# Patient Record
Sex: FEMALE | Race: WHITE
[De-identification: names, ages, dates, MRNs, and addresses within clinical notes are randomized per-mention and may not be internally consistent; named-entity substitution may affect disease eponyms.]

---

## 2017-04-18 ENCOUNTER — HOSPITAL ENCOUNTER (OUTPATIENT)
Dept: HOSPITAL 45 - C.LAB1850 | Age: 27
Discharge: HOME | End: 2017-04-18
Attending: OBSTETRICS & GYNECOLOGY
Payer: COMMERCIAL

## 2017-04-18 DIAGNOSIS — Z34.90: Primary | ICD-10-CM

## 2017-04-20 ENCOUNTER — HOSPITAL ENCOUNTER (OUTPATIENT)
Dept: HOSPITAL 45 - C.LAB1850 | Age: 27
Discharge: HOME | End: 2017-04-20
Attending: OBSTETRICS & GYNECOLOGY
Payer: COMMERCIAL

## 2017-04-20 DIAGNOSIS — Z34.90: Primary | ICD-10-CM

## 2017-05-08 ENCOUNTER — HOSPITAL ENCOUNTER (OUTPATIENT)
Dept: HOSPITAL 45 - C.LAB1850 | Age: 27
Discharge: HOME | End: 2017-05-08
Attending: OBSTETRICS & GYNECOLOGY
Payer: COMMERCIAL

## 2017-05-08 ENCOUNTER — HOSPITAL ENCOUNTER (OUTPATIENT)
Dept: HOSPITAL 45 - C.LABSPEC | Age: 27
Discharge: HOME | End: 2017-05-08
Attending: OBSTETRICS & GYNECOLOGY
Payer: COMMERCIAL

## 2017-05-08 DIAGNOSIS — Z34.90: Primary | ICD-10-CM

## 2017-05-08 LAB
BASOPHILS # BLD: 0.01 K/UL (ref 0–0.2)
BASOPHILS NFR BLD: 0.1 %
COMPLETE: YES
EOSINOPHIL NFR BLD AUTO: 261 K/UL (ref 130–400)
HCT VFR BLD CALC: 39.1 % (ref 37–47)
IG%: 0.2 %
IMM GRANULOCYTES NFR BLD AUTO: 27.7 %
LYMPHOCYTES # BLD: 2.44 K/UL (ref 1.2–3.4)
MCH RBC QN AUTO: 29.4 PG (ref 25–34)
MCHC RBC AUTO-ENTMCNC: 33 G/DL (ref 32–36)
MCV RBC AUTO: 89.1 FL (ref 80–100)
MONOCYTES NFR BLD: 9.1 %
NEUTROPHILS # BLD AUTO: 1.1 %
NEUTROPHILS NFR BLD AUTO: 61.8 %
PMV BLD AUTO: 10.8 FL (ref 7.4–10.4)
RBC # BLD AUTO: 4.39 M/UL (ref 4.2–5.4)
WBC # BLD AUTO: 8.82 K/UL (ref 4.8–10.8)

## 2017-05-11 LAB
CHLAMYDIA TRACH RNA***: NOT DETECTED
GC (NEIS GONORRHOEAE)RNA**: NOT DETECTED

## 2017-07-03 ENCOUNTER — HOSPITAL ENCOUNTER (OUTPATIENT)
Dept: HOSPITAL 45 - C.LAB1850 | Age: 27
Discharge: HOME | End: 2017-07-03
Attending: OBSTETRICS & GYNECOLOGY
Payer: COMMERCIAL

## 2017-07-03 DIAGNOSIS — Z34.82: Primary | ICD-10-CM

## 2017-07-03 LAB — GTGD: 50 GRAMS

## 2017-10-03 ENCOUNTER — HOSPITAL ENCOUNTER (OUTPATIENT)
Dept: HOSPITAL 45 - C.LAB1850 | Age: 27
Discharge: HOME | End: 2017-10-03
Attending: OBSTETRICS & GYNECOLOGY
Payer: COMMERCIAL

## 2017-10-03 DIAGNOSIS — Z34.82: Primary | ICD-10-CM

## 2017-10-03 LAB
APPEARANCE UR: (no result)
BILIRUB UR-MCNC: (no result) MG/DL
COLOR UR: YELLOW
GTGD: 50 GRAMS
HCT VFR BLD CALC: 30.5 % (ref 37–47)
MANUAL MICROSCOPIC REQUIRED?: NO
NITRITE UR QL STRIP: (no result)
PH UR STRIP: 6 [PH] (ref 4.5–7.5)
REVIEW REQ?: YES
SP GR UR STRIP: 1.02 (ref 1–1.03)
URINE EPITHELIAL CELL AUTO: >30 /LPF (ref 0–5)
UROBILINOGEN UR-MCNC: (no result) MG/DL

## 2017-11-29 ENCOUNTER — HOSPITAL ENCOUNTER (OUTPATIENT)
Dept: HOSPITAL 45 - C.LABSPEC | Age: 27
Discharge: HOME | End: 2017-11-29
Attending: OBSTETRICS & GYNECOLOGY
Payer: COMMERCIAL

## 2017-11-29 DIAGNOSIS — Z34.83: Primary | ICD-10-CM

## 2017-12-21 ENCOUNTER — HOSPITAL ENCOUNTER (INPATIENT)
Dept: HOSPITAL 45 - C.LD | Age: 27
LOS: 7 days | Discharge: HOME | End: 2017-12-28
Attending: OBSTETRICS & GYNECOLOGY | Admitting: OBSTETRICS & GYNECOLOGY
Payer: COMMERCIAL

## 2017-12-21 VITALS
WEIGHT: 172.4 LBS | HEIGHT: 64.02 IN | BODY MASS INDEX: 29.43 KG/M2 | HEIGHT: 64.02 IN | WEIGHT: 172.4 LBS | BODY MASS INDEX: 29.43 KG/M2

## 2017-12-21 DIAGNOSIS — Z3A.40: ICD-10-CM

## 2017-12-21 DIAGNOSIS — O48.0: Primary | ICD-10-CM

## 2017-12-21 DIAGNOSIS — Z88.0: ICD-10-CM

## 2017-12-27 VITALS — TEMPERATURE: 97.88 F | HEART RATE: 97 BPM | SYSTOLIC BLOOD PRESSURE: 123 MMHG | DIASTOLIC BLOOD PRESSURE: 62 MMHG

## 2017-12-27 LAB
EOSINOPHIL NFR BLD AUTO: 200 K/UL (ref 130–400)
HCT VFR BLD CALC: 32.5 % (ref 37–47)
HGB BLD-MCNC: 10.7 G/DL (ref 12–16)
MCH RBC QN AUTO: 27.9 PG (ref 25–34)
MCHC RBC AUTO-ENTMCNC: 32.9 G/DL (ref 32–36)
MCV RBC AUTO: 84.9 FL (ref 80–100)
PMV BLD AUTO: 11.2 FL (ref 7.4–10.4)
RED CELL DISTRIBUTION WIDTH CV: 13.8 % (ref 11.5–14.5)
RED CELL DISTRIBUTION WIDTH SD: 42 FL (ref 36.4–46.3)
WBC # BLD AUTO: 12.04 K/UL (ref 4.8–10.8)

## 2017-12-27 PROCEDURE — 3E033VJ INTRODUCTION OF OTHER HORMONE INTO PERIPHERAL VEIN, PERCUTANEOUS APPROACH: ICD-10-PCS | Performed by: OBSTETRICS & GYNECOLOGY

## 2017-12-27 PROCEDURE — 0KQM0ZZ REPAIR PERINEUM MUSCLE, OPEN APPROACH: ICD-10-PCS | Performed by: OBSTETRICS & GYNECOLOGY

## 2017-12-27 RX ADMIN — SODIUM CHLORIDE, SODIUM LACTATE, POTASSIUM CHLORIDE, AND CALCIUM CHLORIDE SCH MLS/HR: 600; 310; 30; 20 INJECTION, SOLUTION INTRAVENOUS at 09:14

## 2017-12-27 RX ADMIN — Medication PRN MG: at 19:40

## 2017-12-27 RX ADMIN — DOCUSATE SODIUM SCH MG: 100 CAPSULE, LIQUID FILLED ORAL at 21:21

## 2017-12-27 NOTE — ANESTHESIA PROCEDURE NOTE
Anesthesia Epidural Removal Nt


Date & Time


Dec 27, 2017 at 18:36





Vital Signs


Pain Intensity:  5.0





Notes


Mental Status:  alert / awake / arousable, participated in evaluation


Nausea / Vomiting:  adequately controlled


Pain:  adequately controlled


Airway Patency, RR, SpO2:  stable & adequate


BP & HR:  stable & adequate


Hydration State:  stable & adequate


Neuraxial Anesthesia:  was administered, sensory block is resolving


Anesthetic Complications:  no major complications apparent, pt satisfied with 

anesthetic care


Epidural:  removed without complications, with tip intact

## 2017-12-27 NOTE — DELIVERY SUMMARY
DATE OF OPERATION:  12/27/2017

 

Apple was induced for postdates.  Induction was begun with IV Pitocin.  She

then received an epidural.  Membranes were ruptured for clear fluid.  She

then progressed to fully dilated and pushed over a total of 2 contractions. 

Baby was delivered in right occiput anterior position.  There was double

nuchal cord, I was able to pass this over the fetal head.  Mouth and then

nares suctioned.  Fluid was clear.  Gentle traction used, no excessive force.

 Baby delivered without any difficulties.  Live vigorous female infant.  Cord

clamped and cut.  Cord gases obtained.  Cord blood obtained.  Placenta

removed with gentle traction.  Second degree tear repaired with 3-0 Vicryl. 

Estimated blood loss 250 mL.  Sponge and instrument counts correct.

 

 

I attest to the content of the Intraoperative Record and any orders documented therein. Any exception
s are noted below.

## 2017-12-28 VITALS
TEMPERATURE: 98.06 F | SYSTOLIC BLOOD PRESSURE: 104 MMHG | DIASTOLIC BLOOD PRESSURE: 71 MMHG | OXYGEN SATURATION: 98 % | HEART RATE: 82 BPM

## 2017-12-28 VITALS
HEART RATE: 80 BPM | OXYGEN SATURATION: 98 % | DIASTOLIC BLOOD PRESSURE: 64 MMHG | SYSTOLIC BLOOD PRESSURE: 112 MMHG | TEMPERATURE: 98.06 F

## 2017-12-28 VITALS — DIASTOLIC BLOOD PRESSURE: 71 MMHG | HEART RATE: 82 BPM | TEMPERATURE: 98.06 F

## 2017-12-28 VITALS
TEMPERATURE: 98.06 F | SYSTOLIC BLOOD PRESSURE: 119 MMHG | OXYGEN SATURATION: 97 % | HEART RATE: 82 BPM | DIASTOLIC BLOOD PRESSURE: 76 MMHG

## 2017-12-28 LAB
HCT VFR BLD CALC: 29.8 % (ref 37–47)
HGB BLD-MCNC: 9.6 G/DL (ref 12–16)

## 2017-12-28 RX ADMIN — DOCUSATE SODIUM SCH MG: 100 CAPSULE, LIQUID FILLED ORAL at 07:32

## 2017-12-28 RX ADMIN — SODIUM CHLORIDE, SODIUM LACTATE, POTASSIUM CHLORIDE, AND CALCIUM CHLORIDE SCH MLS/HR: 600; 310; 30; 20 INJECTION, SOLUTION INTRAVENOUS at 06:16

## 2017-12-28 RX ADMIN — Medication PRN MG: at 11:44

## 2017-12-28 RX ADMIN — Medication PRN MG: at 02:08

## 2017-12-28 RX ADMIN — Medication PRN MG: at 07:32

## 2017-12-28 NOTE — OB/GYN PROGRESS NOTE
OB/GYN Progress Note


Date of Service


Dec 28, 2017.





Subjective


conversation w/ patient, physical exam, chart review, lab review


Ambulation:  ambulating normally


Voiding:  no voiding problems


Passing Gas:  Yes


Diet Tolerance:  Regular Diet


Lochia:  Moderate


Feeding Type:  Breast Feeding





Review of Systems


Constitutional:  No fever, No chills, No sweats


Respiratory:  No cough, No shortness of breath, No dyspnea on exertion


Cardiac:  No chest pain, No edema, No palpitations


Abdomen:  + pain, No nausea, No vomiting


Female :  No dysuria, No urinary frequency





Objective


Vital Signs











  Date Time  Temp Pulse Resp B/P (MAP) Pulse Ox O2 Delivery O2 Flow Rate FiO2


 


17 21:30      Room Air  


 


17 21:30 36.6 97 18 123/62    











Physical Exam


General Appearance:  WELL-APPEARING, WD/WN, NO APPARENT DISTRESS


Respiratory/Chest:  chest non-tender, lungs clear, normal breath sounds, no 

respiratory distress, no accessory muscle use


Cardiovascular:  regular rate, rhythm, no edema, no murmur


Fundus:  Firm, Relation to Umbilicus (at the level of the umbilicus )


Extremities:  no calf tenderness





Laboratory Results





Last 24 Hours








Test


  17


09:14 17


06:30


 


White Blood Count 12.04 K/uL  


 


Red Blood Count 3.83 M/uL  


 


Hemoglobin 10.7 g/dL  


 


Hematocrit 32.5 %  


 


Mean Corpuscular Volume 84.9 fL  


 


Mean Corpuscular Hemoglobin 27.9 pg  


 


Mean Corpuscular Hemoglobin


Concent 32.9 g/dl 


  


 


 


RDW Standard Deviation 42.0 fL  


 


RDW Coefficient of Variation 13.8 %  


 


Platelet Count 200 K/uL  


 


Mean Platelet Volume 11.2 fL  











Assessment and Plan


Post-Partum


Day Number:  1


Continue Routine Care:


27 F  now 2 delivered vaginally on  18:11. Gave birth to a female at 

40.6 weeks gestation. O+/GBS-/RI/ Vitals reviewed and WNL. Hgb 10.7 on , 

today is 9.6,  no signs or sx of anemia. Pt is doing well clinically. Continue 

routine pp care; monitor lochia, control pain with Tylenol/ibuprofen, encourage 

ambulation and support breastfeeding. 





Resident Physician Supervision Note:





I was present with Dr. padilla during the history and exam. I discussed the 

case with the resident and agree with the findings and plan as documented in 

the note.  Any exceptions or clarifications are listed here: [None]





Documented By:  Michael Rojas

## 2017-12-28 NOTE — DISCHARGE INSTRUCTIONS
Discharge Instructions


Date of Service


Dec 28, 2017.





Admission


Reason for Admission:  Induction





Discharge


Discharge Diagnosis / Problem:  recovery from vaginal delivery





Discharge Goals


Goal(s):  Routine recovery after delivery





Medications


Continue Dispensed Medications:  supercream, dermaplast, tucks, lansinoh





Activity Recommendations


Activity Limitations:  per Instructions/Follow-up section





.





Instructions / Follow-Up


Instructions / Follow-Up





ACTIVITY RECOMMENDATIONS:





* Gradual return to full activity over the next 2-3 weeks.


* No lifting - nothing heavier than baby over the next 2-3 weeks.


* Do not engage in vigorous exercise, sexual activity or sports until cleared by


   your physician.


* Do not drive or operate any motorized equipment until cleared by your 

physician.


* You may shower/bathe daily.








MEDICATIONS:





For discomfort or pain, you may use Acetaminophen (Tylenol), Ibuprofen (Advil),


or Naproxen (Aleve) following the package directions. For constipation you may 


use Colace following the package directions.








BREAST CARE:





If you are not breast feeding:





*  Wear a supportive bra 24 hours a day for one to two weeks.


*  Avoid stimulating your breasts and nipples as much as possible during the 

first 


    few weeks after delivery.


*  When taking a shower, have the warm water hit your back, not breasts.


*  When your breasts feel full, apply ice packs.  Usually three to four times a 

day


    helps ease the discomfort.


*  Take a mild pain medication (Tylenol / Motrin) when you are uncomfortable.





If breast feeding:





*  Use breast milk to lubricate nipples.  Lansinoh cream may be used for sore 

nipples. 


    You do not need to remove cream prior to breast feeding.  If using a 

different brand


   of cream, check the label for directions regarding removal of cream prior to 

nursing.


*  Wear a supportive bra.


*  If having problems with breasts or breast feeding, call a lactation 

consultant 


    or your health care provider.








EPISIOTOMY CARE:





After delivery, if you have an episiotomy (stitches), the following steps will 

ease


discomfort and aid healing.





*  For the first 24 hours after delivery, place ice packs next to your 

episiotomy to


   help reduce swelling.


*  After the first 24 hour-period, sitz baths, either portable or in the tub, 

are suggested.


    A shower with a shower arm sprayed over the episiotomy may be comforting.


*  Stella care should be done after each voiding and bowel movement.  Squirt warm 

water


    from a plastic bottle over the perineum (region of the body between the 

anus and 


    urinary opening) and pat dry.


*  Use Dermoplast to ease discomfort.  Shake container.  Spray directly over 

the 


    episiotomy.  Place a Tucks on a clean sanitary pad next to your episiotomy.








SPECIAL CARE INSTRUCTIONS:





When you are discharged from the hospital, it is important for you to follow 

the instructions 


listed below:





*  During the first week at home, you should be able to care for yourself and 

your baby.


    In addition, the usual light household activities are encouraged.





*  Limit your activities to the way you feel.  Do not try to clean the house or 

move 


    furniture. Be sensible.





*  If you actively engage in sports and have done so up until the time of your 

delivery, 


    you may resume these activities as soon as you feel able.  This may take up 

to one 


    month or even longer.  Use good judgment.





*  Continue to take your prenatal vitamins for at least six weeks after the 

birth of your baby.





*  Your diet need not be limited unless you were on a special diet before your 

delivery.  


    Breast-feeding mothers need around 2500 calories per day and at least 64-80 

ounces of 


    fluid per day (8 to 10 glasses).





*  You should eat foods from the four major food groups.  Crash diets or fad 

diets are to be 


    avoided.  Eating lean meats, fresh fruits and vegetables, low-fat dairy 

products, high fiber


    foods and a regular exercise program, will help you get back to your pre-

pregnancy weight


    without putting your health at risk.





*  Constipation is sometimes a problem after delivery.  Take a mild laxative as 

needed.  If 


    breast feeding, Milk of Magnesia is acceptable to use. You may use a 

suppository or Fleets


    enema if no episiotomy.





*  A daily shower or tub bath is suggested.  Be sure to thoroughly and gently 

dry the perineum.





*  A bloody vaginal discharge will usually continue until around four weeks 

post partum.  A 


    small amount of bleeding may continue for as long as six weeks.  Vaginal 

discharge changes


    from the bright red bleeding after delivery to pink then brownish and 

finally yellowish-pink 


    before becoming white and disappearing.





*  Bleeding may increase with activity.  Your first period may come in 4-8 

weeks.  If you are 


    breast feeding, your period may be delayed even longer.





*  [place] (sex) can begin whenever both you and your partner feel 

comfortable and do 


    not have any form of genital infection.  It is recommended that you wait at 

least six weeks


    for internal and external healing to occur.  If you have questions, please 

talk to your health


    care practitioner.  A condom should be used to prevent infection and 

pregnancy.





*  Foreplay, gentle intercourse and lubrication is very important the first 

several times to 


    prevent pain.  A water-based lubricant such as K-Y jelly or Astroglide may 

be used.





*  If you have RH negative blood and your baby is RH positive, you will receive 

RHOGAM by 


    injection prior to discharge.  The nurse will give you a card to keep with 

you that has the


    date and place that you received RHOGAM after delivery.





*  During your prenatal care, you had a Rubella screen done to check for the 

presence of 


    rubella antibodies in your blood.  If your test was negative, you will 

receive a Rubella 


    vaccine prior to discharge.  This vaccine may cause a fever, soreness at 

the injection site


    and flu-like symptoms.  If these symptoms persist, notify your health care 

practitioner.  


    Pregnancy is not advised for one month after a Rubella vaccine.





*  Verbalizes understanding of car seat law as reviewed with patient nursing.





*  Car Seat hand-out given and reviewed with patient by nursing.





*  Shaken baby information reviewed with patient by nursing.


 


Call you doctor if:





*  Heavy bleeding (saturating several pads an hour) or passing clots the size 

of your fist.


*  A fever >101 degrees F (38.3 degrees C) on two occasions four hours apart and

/or chills.


*  Unusual pain in the pelvic or vaginal areas.


* "Baby Blues" lasting longer than two weeks.





If you have any questions or concerns, call your health care practitioner at 


[phone].








FOLLOW UP VISIT:





*  Please call the office at [phone] to schedule a 6 week postpartum 


    examination.  It is important you keep this appointment.  It is important 


    for you to make arrangements for either yearly or twice yearly check-ups 


    thereafter.





Current Hospital Diet


Patient's current hospital diet: Regular OB Diet





Discharge Diet


Recommended Diet:  Regular OB Diet





Pending Studies


Studies pending at discharge:  no





Medical Emergencies








.


Who to Call and When:





Medical Emergencies:  If at any time you feel your situation is an emergency, 

please call 911 immediately.





.





Non-Emergent Contact


Non-Emergency issues call your:  Gynecologist





.


.








"Provider Documentation" section prepared by Urban Garcia.








.





VTE Core Measure


Inpt VTE Proph given/why not?:  Treatment not indicated

## 2018-04-18 ENCOUNTER — HOSPITAL ENCOUNTER (OUTPATIENT)
Dept: HOSPITAL 45 - X.SURG | Age: 28
Discharge: HOME | End: 2018-04-18
Attending: OBSTETRICS & GYNECOLOGY
Payer: COMMERCIAL

## 2018-04-18 VITALS — HEART RATE: 63 BPM | SYSTOLIC BLOOD PRESSURE: 103 MMHG | OXYGEN SATURATION: 100 % | DIASTOLIC BLOOD PRESSURE: 66 MMHG

## 2018-04-18 VITALS
WEIGHT: 147.31 LBS | HEIGHT: 64.02 IN | BODY MASS INDEX: 25.15 KG/M2 | HEIGHT: 64.02 IN | WEIGHT: 147.31 LBS | BODY MASS INDEX: 25.15 KG/M2

## 2018-04-18 DIAGNOSIS — Z30.2: Primary | ICD-10-CM

## 2018-04-18 DIAGNOSIS — Z88.0: ICD-10-CM

## 2018-04-18 DIAGNOSIS — Z83.3: ICD-10-CM

## 2018-04-18 DIAGNOSIS — F32.9: ICD-10-CM

## 2018-04-18 DIAGNOSIS — Z82.49: ICD-10-CM

## 2018-04-18 DIAGNOSIS — Z88.1: ICD-10-CM

## 2018-04-18 DIAGNOSIS — Z80.0: ICD-10-CM

## 2018-04-18 DIAGNOSIS — Z87.891: ICD-10-CM

## 2018-04-18 NOTE — OPERATIVE REPORT
DATE OF OPERATION:  04/18/2018

 

PREOPERATIVE DIAGNOSIS:  Request sterilization.

 

POSTOPERATIVE DIAGNOSIS:  Request sterilization.

 

PROCEDURE:  Laparoscopic tubal sterilization with bipolar Kleppinger

coagulation.

 

SURGEON:  Michael Rojas MD

 

ASSISTANT:  None.

 

ESTIMATED BLOOD LOSS:  Less than 5 mL.

 

FINDINGS:  Consistent with postop diagnosis.

 

SPECIMENS:  None.

 

DRAINS:  None.

 

ANESTHESIA:  General.

 

COMPLICATIONS:  None.

 

DISPOSITION:  Recovery room.

 

DESCRIPTION OF PROCEDURE:  Apple was given a general anesthetic, prepped and

draped in dorsal lithotomy position in [facility].  The bladder was

drained and a cervical acorn was attached to her cervix attached to an [place]is.

 

We did a laparoscopic subumbilical cut down incision through the subcutaneous

fat to the fascia, splitting the rectus muscles and entering the peritoneal

cavity without difficulty.  A blunt-tipped Amanda trocar was then placed. 

Balloon was inflated to stabilize the port.  CO2 gas was used to insufflate

the abdomen.

 

FINDINGS:  Upper abdomen normal, no sign of visceral organ injury.

 

Deep Trendelenburg position was obtained.

 

FINDINGS:  Tubes appeared normal as did the pelvis, adnexa, and uterus.

 

We started with the left fallopian tube, identified the tube by identifying

the fimbria and the tube was then coagulated using the bipolar Kleppinger in

at least 4 separate locations for full thickness coagulation.  Same process

was then repeated on the right.  We took care to avoid damage to the

surrounding structures and both tubes were followed to their fimbriated ends.

 At the end of the procedure, pictures were taken for documentation. 

Instruments were removed under direct visualization as was the port.  Gas was

allowed to escape.  The incision was injected with 0.5% Marcaine.  Fascia

closed with 0 Vicryl several figure-of-eight interrupted sutures and skin

closed with 4-0 subcuticular Monocryl.  Sponge and instrument counts correct.

 Instruments were removed from the cervix and vagina.

 

 

I attest to the content of the Intraoperative Record and any orders documented therein. Any exception
s are noted below.

## 2018-04-18 NOTE — DISCHARGE INSTRUCTIONS
Discharge Instructions


Date of Service


Apr 18, 2018.





Admission


Reason for Admission:  Request For Sterilization





Discharge


Discharge Diagnosis / Problem:  sterilization





Discharge Goals


Goal(s):  Routine recovery after surgery





Activity Recommendations


Activity Limitations:  per Instructions/Follow-up section





.





Instructions / Follow-Up


Instructions / Follow-Up





   ACTIVITY RECOMMENDATIONS:





*  Rest the first 2-3 days. You should be back to your normal activity levels 

by day 3.





*  No heavy lifting for 2 weeks.





*  No intercourse, tampons or douching for 1-2 weeks.





*  You may shower the next day.





*  Do not drive anytime that you are taking narcotic pain medicines.








   RETURN TO SCHOOL/WORK:





*  May return to school or work after 2-3 days.








   DIET:





Nausea may occur in the immediate post-operative period.  If so, take clear 

liquids such as tea,


bouillon, apple juice until all nausea has subsided, then resume usual diet.








  MEDICATIONS:





Resume previous medications unless instructed otherwise by your surgeon.





Ibuprofen 200mg 2-3 tablets every 4-6 hours as needed


      -- OR --


Aleve 2 tablets every 8-12 hours as needed for post-operative discomfort





Medications are over the counter. Tylenol may be used if above medications are 

contraindicated


or not preferred. Medication should be taken with food or milk. Do not take on 

an empty stomach.








  SPECIAL CARE INSTRUCTIONS:





*  Check temperature twice daily for one week. report any elevation over 101 

degrees.





*  You may experience some vagina spotting and/or bleeding. This is normal for 1

-2 weeks and


   should not be heavier than a normal period. If it is unusual in amount, call 

your physician.





*  Post-operative discomfort may consist of a sore throat, a "bloated" feeling 

and pain in the 


   shoulders. these are normal symptoms, which usually only last for 2-3 days.





*  Remove band-aids tomorrow and shower. There is no need to replace band-aids 

unless there


   is drainage or discomfort.








 FOLLOW UP VISIT:





Call your doctor's office for a post-operative  2 week visit if not already 

scheduled.





Current Hospital Diet


Patient's current hospital diet:





Discharge Diet


Recommended Diet:  Regular Diet





Procedures


Procedures Performed:  


Laparoscopic Tubal Sterilization





Pending Studies


Studies pending at discharge:  no





Medical Emergencies








.


Who to Call and When:





Medical Emergencies:  If at any time you feel your situation is an emergency, 

please call 911 immediately.





.





Non-Emergent Contact


Non-Emergency issues call your:  Gynecologist





.


.








"Provider Documentation" section prepared by Michael Rojas.








.

## 2018-04-18 NOTE — [facility] POST OPERATIVE BRIEF NOTE
Immediate Operative Summary


Operative Date


Apr 18, 2018.





Pre-Operative Diagnosis





Requests Sterilization





Post-Operative Diagnosis





same





Procedure(s) Performed





Laparoscopic Tubal Sterilization





Surgeon


Dr. Rojas





Assistant Surgeon(s)


0





Estimated Blood Loss


5cc





Findings


Consistent with Post-Op Diagnosis





Specimens





none





Drains


None





Anesthesia Type


General





Complication(s)


none





Disposition


Accompanied Pt To Recovery:  no


Disposition:  Recovery Room / PACU

## 2018-04-18 NOTE — ANESTHESIA PROGRESS NT - [facility]
Anesthesia Post Op Note


Date & Time


Apr 18, 2018 at 13:53





Vital Signs


Pain Intensity:  5





Vital Signs Past 12 Hours








  Date Time  Temp Pulse Resp B/P (MAP) Pulse Ox O2 Delivery O2 Flow Rate FiO2


 


4/18/18 13:16      Room Air  


 


4/18/18 12:34 36.3 69 20 114/74 99 Mask 6 


 


4/18/18 10:45 36.7 65 16 103/65 (78) 99 Room Air  











Notes


Mental Status:  alert / awake / arousable, participated in evaluation


Pt Amnestic to Procedure:  Yes


Nausea / Vomiting:  adequately controlled


Pain:  adequately controlled


Airway Patency, RR, SpO2:  stable & adequate


BP & HR:  stable & adequate


Hydration State:  stable & adequate


Anesthetic Complications:  no major complications apparent